# Patient Record
Sex: FEMALE | HISPANIC OR LATINO
[De-identification: names, ages, dates, MRNs, and addresses within clinical notes are randomized per-mention and may not be internally consistent; named-entity substitution may affect disease eponyms.]

---

## 2022-04-13 PROBLEM — Z00.00 ENCOUNTER FOR PREVENTIVE HEALTH EXAMINATION: Status: ACTIVE | Noted: 2022-04-13

## 2022-04-14 ENCOUNTER — APPOINTMENT (OUTPATIENT)
Dept: MATERNAL FETAL MEDICINE | Facility: CLINIC | Age: 27
End: 2022-04-14
Payer: MEDICAID

## 2022-04-14 ENCOUNTER — ASOB RESULT (OUTPATIENT)
Age: 27
End: 2022-04-14

## 2022-04-14 VITALS — HEIGHT: 60 IN | BODY MASS INDEX: 30.63 KG/M2 | WEIGHT: 156 LBS

## 2022-04-14 DIAGNOSIS — Z83.3 FAMILY HISTORY OF DIABETES MELLITUS: ICD-10-CM

## 2022-04-14 DIAGNOSIS — Z78.9 OTHER SPECIFIED HEALTH STATUS: ICD-10-CM

## 2022-04-14 DIAGNOSIS — Z82.49 FAMILY HISTORY OF ISCHEMIC HEART DISEASE AND OTHER DISEASES OF THE CIRCULATORY SYSTEM: ICD-10-CM

## 2022-04-14 PROCEDURE — G0108 DIAB MANAGE TRN  PER INDIV: CPT | Mod: 95

## 2022-04-20 RX ORDER — LANCETS 33 GAUGE
EACH MISCELLANEOUS
Qty: 1 | Refills: 3 | Status: ACTIVE | COMMUNITY
Start: 2022-04-19 | End: 1900-01-01

## 2022-04-20 RX ORDER — BLOOD SUGAR DIAGNOSTIC
STRIP MISCELLANEOUS 4 TIMES DAILY
Qty: 1 | Refills: 3 | Status: ACTIVE | COMMUNITY
Start: 2022-04-19 | End: 1900-01-01

## 2022-04-20 RX ORDER — BLOOD-GLUCOSE METER
W/DEVICE EACH MISCELLANEOUS
Qty: 1 | Refills: 0 | Status: ACTIVE | COMMUNITY
Start: 2022-04-19 | End: 1900-01-01

## 2022-04-29 ENCOUNTER — APPOINTMENT (OUTPATIENT)
Dept: MATERNAL FETAL MEDICINE | Facility: CLINIC | Age: 27
End: 2022-04-29
Payer: MEDICAID

## 2022-04-29 ENCOUNTER — APPOINTMENT (OUTPATIENT)
Dept: ANTEPARTUM | Facility: CLINIC | Age: 27
End: 2022-04-29
Payer: MEDICAID

## 2022-04-29 ENCOUNTER — ASOB RESULT (OUTPATIENT)
Age: 27
End: 2022-04-29

## 2022-04-29 VITALS
HEIGHT: 60 IN | OXYGEN SATURATION: 98 % | RESPIRATION RATE: 16 BRPM | BODY MASS INDEX: 30.63 KG/M2 | DIASTOLIC BLOOD PRESSURE: 64 MMHG | HEART RATE: 66 BPM | SYSTOLIC BLOOD PRESSURE: 122 MMHG | WEIGHT: 156 LBS

## 2022-04-29 DIAGNOSIS — O99.213 OBESITY COMPLICATING PREGNANCY, THIRD TRIMESTER: ICD-10-CM

## 2022-04-29 DIAGNOSIS — Z86.32 PERSONAL HISTORY OF GESTATIONAL DIABETES: ICD-10-CM

## 2022-04-29 DIAGNOSIS — O24.410 GESTATIONAL DIABETES MELLITUS IN PREGNANCY, DIET CONTROLLED: ICD-10-CM

## 2022-04-29 DIAGNOSIS — Z3A.33 33 WEEKS GESTATION OF PREGNANCY: ICD-10-CM

## 2022-04-29 PROCEDURE — 76816 OB US FOLLOW-UP PER FETUS: CPT

## 2022-04-29 PROCEDURE — 99213 OFFICE O/P EST LOW 20 MIN: CPT

## 2022-04-29 PROCEDURE — 76819 FETAL BIOPHYS PROFIL W/O NST: CPT

## 2022-04-29 RX ORDER — VITAMIN C, CALCIUM, IRON, VITAMIN D3, VITAMIN E, VITAMIN B1, VITAMIN B2, VITAMIN B3, VITAMIN B6, FOLIC ACID, IODINE, ZINC, COPPER, DOCUSATE SODIUM, DOCOSAHEXAENOIC ACID (DHA) 27-1-50 MG
KIT ORAL
Refills: 0 | Status: ACTIVE | COMMUNITY

## 2022-04-29 NOTE — DISCUSSION/SUMMARY
[FreeTextEntry1] : Patient is a 26-year-old -0-0-1 being seen today at 33 weeks for newly diagnosed gestational diabetes.  Her obstetrical history is significant for delivering 2013 of a liveborn female  weighing 7 pounds 5 ounces delivered at term via normal spontaneous vaginal delivery.  No problems or complications were noted with that pregnancy.\par \par Evaluation of her diabetic flowsheets demonstrates good control of fasting and postprandial blood sugars.  A growth scan was performed today and reveals a single viable intrauterine gestation with the estimated fetal weight of 4 pounds 13 ounces which is consistent with the 54th percentile.  Vertex presentation with an anterior placenta was seen and the amniotic fluid index is normal at 12.7 cm.  Vital signs today reveal a blood pressure of 122/64, maternal weight is 156 pounds which is consistent with a BMI of 30.47 kg.\par \par Gestational diabetes;\par \par Dietary consultation has been performed and was sent under separate cover.  Evaluation of the patient's diabetic flowsheets demonstrates good control of her fasting and postprandial blood sugars.  Medical intervention is not indicated.  Management of the pregnancy going forward was discussed.  The patient will continue on the current ADA diet with home glucose monitoring.  She will start weekly  testing beginning at 36 weeks until delivery.  A growth scan in 1 month is recommended.  Follow-up dietary consultation is scheduled.  Follow-up maternal-fetal medicine consultation if clinically indicated.  Problems and complications related to a diabetic pregnancy including fetal macrosomia, increased risk for operative vaginal delivery and  section, shoulder dystocia with associated morbidity mortality and increased risk for  intensive care unit admission were discussed.  The patient had a hemoglobin A1c performed on  which was normal at 5.1%.  All the above was discussed with the patient and her  and all their questions were answered.\par \par COVID-19 vaccination;\par \par COVID-19 vaccination in pregnancy was discussed.  We advise pregnant patients to be vaccinated.  The patient has certain comorbidities which put her at increased risk for problems and/or complications should she have a COVID infection during pregnancy.  She has completed her initial COVID vaccination with the Pfizer vaccine.  Risks and benefits of the booster vaccination were discussed and after all their questions were answered the patient has opted for booster vaccination after delivery.\par \par Her mother has diabetes and hypertensive disease.  She has no previous medical or surgical history.  She has no known allergies to medications and denies alcohol, tobacco or drug use.\par \par I spent a total of 28 minutes reviewing the patient's prenatal record, outside medical records, previous consultations and ultrasound reports counseling and coordinating care.\par \par Recommendations;\par \par 1.  Continue current ADA diet and glucose monitoring.\par 2.  Weekly  testing beginning at 36 weeks until delivery.\par 3.  Growth scan in 1 month is recommended.\par 4.  Follow-up dietary consultation scheduled.\par 5.  Follow-up maternal-fetal medicine consultation as clinically indicated.

## 2022-05-06 ENCOUNTER — APPOINTMENT (OUTPATIENT)
Dept: MATERNAL FETAL MEDICINE | Facility: CLINIC | Age: 27
End: 2022-05-06
Payer: MEDICAID

## 2022-05-06 ENCOUNTER — ASOB RESULT (OUTPATIENT)
Age: 27
End: 2022-05-06

## 2022-05-06 VITALS — HEIGHT: 60 IN | BODY MASS INDEX: 31.41 KG/M2 | WEIGHT: 160 LBS

## 2022-05-06 PROCEDURE — G0108 DIAB MANAGE TRN  PER INDIV: CPT | Mod: 95

## 2022-05-19 ENCOUNTER — APPOINTMENT (OUTPATIENT)
Dept: MATERNAL FETAL MEDICINE | Facility: CLINIC | Age: 27
End: 2022-05-19
Payer: MEDICAID

## 2022-05-19 ENCOUNTER — ASOB RESULT (OUTPATIENT)
Age: 27
End: 2022-05-19

## 2022-05-19 VITALS — BODY MASS INDEX: 31.41 KG/M2 | WEIGHT: 160 LBS | HEIGHT: 60 IN

## 2022-05-19 PROCEDURE — G0108 DIAB MANAGE TRN  PER INDIV: CPT | Mod: 95

## 2022-05-20 ENCOUNTER — APPOINTMENT (OUTPATIENT)
Dept: ANTEPARTUM | Facility: CLINIC | Age: 27
End: 2022-05-20
Payer: MEDICAID

## 2022-05-20 ENCOUNTER — ASOB RESULT (OUTPATIENT)
Age: 27
End: 2022-05-20

## 2022-05-20 PROCEDURE — 76819 FETAL BIOPHYS PROFIL W/O NST: CPT

## 2022-05-20 PROCEDURE — 76816 OB US FOLLOW-UP PER FETUS: CPT

## 2022-05-26 ENCOUNTER — ASOB RESULT (OUTPATIENT)
Age: 27
End: 2022-05-26

## 2022-05-26 ENCOUNTER — APPOINTMENT (OUTPATIENT)
Dept: MATERNAL FETAL MEDICINE | Facility: CLINIC | Age: 27
End: 2022-05-26
Payer: MEDICAID

## 2022-05-26 VITALS — WEIGHT: 160 LBS | HEIGHT: 60 IN | BODY MASS INDEX: 31.41 KG/M2

## 2022-05-26 PROCEDURE — G0108 DIAB MANAGE TRN  PER INDIV: CPT | Mod: 95

## 2022-05-26 RX ORDER — BLOOD-GLUCOSE METER
W/DEVICE KIT MISCELLANEOUS
Qty: 1 | Refills: 0 | Status: DISCONTINUED | COMMUNITY
Start: 2022-04-14 | End: 2022-05-26

## 2022-05-26 RX ORDER — BLOOD SUGAR DIAGNOSTIC
STRIP MISCELLANEOUS 4 TIMES DAILY
Qty: 1 | Refills: 3 | Status: DISCONTINUED | COMMUNITY
Start: 2022-04-14 | End: 2022-05-26

## 2022-05-26 RX ORDER — LANCETS
EACH MISCELLANEOUS
Qty: 1 | Refills: 3 | Status: DISCONTINUED | COMMUNITY
Start: 2022-04-14 | End: 2022-05-26

## 2022-05-27 ENCOUNTER — APPOINTMENT (OUTPATIENT)
Dept: ANTEPARTUM | Facility: CLINIC | Age: 27
End: 2022-05-27
Payer: MEDICAID

## 2022-05-27 ENCOUNTER — ASOB RESULT (OUTPATIENT)
Age: 27
End: 2022-05-27

## 2022-05-27 PROCEDURE — 76819 FETAL BIOPHYS PROFIL W/O NST: CPT

## 2022-05-27 PROCEDURE — 76820 UMBILICAL ARTERY ECHO: CPT

## 2022-06-03 ENCOUNTER — ASOB RESULT (OUTPATIENT)
Age: 27
End: 2022-06-03

## 2022-06-03 ENCOUNTER — APPOINTMENT (OUTPATIENT)
Dept: ANTEPARTUM | Facility: CLINIC | Age: 27
End: 2022-06-03
Payer: MEDICAID

## 2022-06-03 PROCEDURE — 76819 FETAL BIOPHYS PROFIL W/O NST: CPT

## 2022-06-09 ENCOUNTER — ASOB RESULT (OUTPATIENT)
Age: 27
End: 2022-06-09

## 2022-06-09 ENCOUNTER — APPOINTMENT (OUTPATIENT)
Dept: MATERNAL FETAL MEDICINE | Facility: CLINIC | Age: 27
End: 2022-06-09
Payer: MEDICAID

## 2022-06-09 VITALS — HEIGHT: 60 IN

## 2022-06-09 PROCEDURE — G0108 DIAB MANAGE TRN  PER INDIV: CPT | Mod: 95

## 2022-06-10 ENCOUNTER — APPOINTMENT (OUTPATIENT)
Dept: ANTEPARTUM | Facility: CLINIC | Age: 27
End: 2022-06-10
Payer: MEDICAID

## 2022-06-10 ENCOUNTER — ASOB RESULT (OUTPATIENT)
Age: 27
End: 2022-06-10

## 2022-06-10 PROCEDURE — 76819 FETAL BIOPHYS PROFIL W/O NST: CPT

## 2022-06-17 ENCOUNTER — APPOINTMENT (OUTPATIENT)
Dept: ANTEPARTUM | Facility: CLINIC | Age: 27
End: 2022-06-17